# Patient Record
Sex: MALE
[De-identification: names, ages, dates, MRNs, and addresses within clinical notes are randomized per-mention and may not be internally consistent; named-entity substitution may affect disease eponyms.]

---

## 2018-10-24 ENCOUNTER — HOSPITAL ENCOUNTER (INPATIENT)
Dept: HOSPITAL 31 - C.ER | Age: 38
LOS: 4 days | Discharge: HOME | DRG: 772 | End: 2018-10-28
Attending: PSYCHIATRY & NEUROLOGY | Admitting: PSYCHIATRY & NEUROLOGY
Payer: MEDICAID

## 2018-10-24 DIAGNOSIS — F16.10: ICD-10-CM

## 2018-10-24 DIAGNOSIS — F12.90: ICD-10-CM

## 2018-10-24 DIAGNOSIS — F11.23: Primary | ICD-10-CM

## 2018-10-24 DIAGNOSIS — F32.2: ICD-10-CM

## 2018-10-24 DIAGNOSIS — F17.210: ICD-10-CM

## 2018-10-24 LAB
ALBUMIN SERPL-MCNC: 4.6 G/DL (ref 3.5–5)
ALBUMIN/GLOB SERPL: 0.9 {RATIO} (ref 1–2.1)
ALT SERPL-CCNC: 28 U/L (ref 21–72)
AST SERPL-CCNC: 20 U/L (ref 17–59)
BACTERIA #/AREA URNS HPF: (no result) /[HPF]
BASOPHILS # BLD AUTO: 0.1 K/UL (ref 0–0.2)
BASOPHILS NFR BLD: 0.8 % (ref 0–2)
BILIRUB UR-MCNC: NEGATIVE MG/DL
BUN SERPL-MCNC: 15 MG/DL (ref 9–20)
CALCIUM SERPL-MCNC: 10 MG/DL (ref 8.6–10.4)
EOSINOPHIL # BLD AUTO: 0.2 K/UL (ref 0–0.7)
EOSINOPHIL NFR BLD: 2.5 % (ref 0–4)
ERYTHROCYTE [DISTWIDTH] IN BLOOD BY AUTOMATED COUNT: 15.3 % (ref 11.5–14.5)
GFR NON-AFRICAN AMERICAN: > 60
GLUCOSE UR STRIP-MCNC: NORMAL MG/DL
HGB BLD-MCNC: 12 G/DL (ref 12–18)
LEUKOCYTE ESTERASE UR-ACNC: (no result) LEU/UL
LYMPHOCYTES # BLD AUTO: 2.3 K/UL (ref 1–4.3)
LYMPHOCYTES NFR BLD AUTO: 35.1 % (ref 20–40)
MCH RBC QN AUTO: 27.7 PG (ref 27–31)
MCHC RBC AUTO-ENTMCNC: 32.7 G/DL (ref 33–37)
MCV RBC AUTO: 84.6 FL (ref 80–94)
MONOCYTES # BLD: 0.4 K/UL (ref 0–0.8)
MONOCYTES NFR BLD: 5.4 % (ref 0–10)
NEUTROPHILS # BLD: 3.7 K/UL (ref 1.8–7)
NEUTROPHILS NFR BLD AUTO: 56.2 % (ref 50–75)
NRBC BLD AUTO-RTO: 0.1 % (ref 0–2)
PH UR STRIP: 6 [PH] (ref 5–8)
PLATELET # BLD: 302 K/UL (ref 130–400)
PMV BLD AUTO: 7.9 FL (ref 7.2–11.7)
PROT UR STRIP-MCNC: NEGATIVE MG/DL
RBC # BLD AUTO: 4.32 MIL/UL (ref 4.4–5.9)
RBC # UR STRIP: NEGATIVE /UL
SP GR UR STRIP: 1.02 (ref 1–1.03)
SQUAMOUS EPITHIAL: 2 /HPF (ref 0–5)
UROBILINOGEN UR-MCNC: NORMAL MG/DL (ref 0.2–1)
WBC # BLD AUTO: 6.5 K/UL (ref 4.8–10.8)

## 2018-10-24 PROCEDURE — HZ56ZZZ INDIVIDUAL PSYCHOTHERAPY FOR SUBSTANCE ABUSE TREATMENT, PSYCHOEDUCATION: ICD-10-PCS | Performed by: PSYCHIATRY & NEUROLOGY

## 2018-10-24 PROCEDURE — HZ2ZZZZ DETOXIFICATION SERVICES FOR SUBSTANCE ABUSE TREATMENT: ICD-10-PCS | Performed by: PSYCHIATRY & NEUROLOGY

## 2018-10-24 PROCEDURE — HZ59ZZZ INDIVIDUAL PSYCHOTHERAPY FOR SUBSTANCE ABUSE TREATMENT, SUPPORTIVE: ICD-10-PCS | Performed by: PSYCHIATRY & NEUROLOGY

## 2018-10-24 NOTE — C.PDOC
History Of Present Illness


37 y/o male presents to the ED requesting detox from heroin. Admits he last used

earlier today. Otherwise patient denies any physical complaints. Also denies any

suicidal or homicidal ideation. 





Time Seen by Provider: 10/24/18 20:23


Chief Complaint (Nursing): Substance Abuse


History Per: Patient


History/Exam Limitations: no limitations


Onset/Duration Of Symptoms: Days


Current Symptoms Are (Timing): Still Present


Suicide/Self Injury Attempted (Context): None


Modifying Factor(s): Other (Heroin)


Associated Symptoms: denies: Suicidal Thoughts, Suicidal Plan


Involuntary Hold By: None


Recent travel outside of the [place]: No





Past Medical History


Reviewed: Historical Data, Nursing Documentation, Vital Signs


Vital Signs: 





                                Last Vital Signs











Temp  97.6 F   10/24/18 20:06


 


Pulse  84   10/24/18 20:06


 


Resp  20   10/24/18 20:06


 


BP  110/74   10/24/18 20:06


 


Pulse Ox  100   10/24/18 20:06














- Medical History


PMH: HIV


Other PMH: Substance abuse


Family History: States: No Known Family Hx





- Social History


Hx Tobacco Use: Yes


Hx Alcohol Use: No


Hx Substance Use: Yes





- Immunization History


Hx Tetanus Toxoid Vaccination: No


Hx Influenza Vaccination: No


Hx Pneumococcal Vaccination: No





Review Of Systems


Constitutional: Negative for: Fever, Chills


Gastrointestinal: Negative for: Nausea, Vomiting


Psych: Positive for: Other (Heroin abuse).  Negative for: Suicidal ideation, 

Withdrawal





Physical Exam





- Physical Exam


Appears: Non-toxic, No Acute Distress


Skin: Warm, Dry


Head: Normacephalic


Eye(s): bilateral: Normal Inspection


Neck: Trachea Midline, Supple


Chest: Symmetrical


Cardiovascular: Rhythm Regular


Respiratory: No Rales, No Rhonchi, No Wheezing


Gastrointestinal/Abdominal: Soft, No Tenderness, No Distention


Extremity: Bilateral: Atraumatic, Normal Color And Temperature, Normal ROM


Pulses: Left Dorsalis Pedis: Normal, Right Dorsalis Pedis: Normal


Neurological/Psych: Oriented x3





ED Course And Treatment





- Laboratory Results


Result Diagrams: 


                                 10/24/18 20:29





                                 10/24/18 20:29


O2 Sat by Pulse Oximetry: 100 (RA)


Pulse Ox Interpretation: Normal


Progress Note: Blood work and urine sent to the lab for analysis. Crisis to 

arrange detox bed placement.





Disposition


Discussed With : Emma Francis


Comment: accepted the pt onhis service and took over the care at 10:02 PM


Doctor Will See Patient In The: Hospital


Counseled Patient/Family Regarding: Studies Performed, Diagnosis





- Disposition


Disposition: HOSPITALIZED


Disposition Time: 20:27


Condition: FAIR





- POA


Present On Arrival: None





- Clinical Impression


Clinical Impression: 


 Drug abuse








- Scribe Statement


The provider has reviewed the documentation as recorded by the Scribe (Shala Baires)


Provider Attestation: 





All medical record entries made by the Scribe were at my direction and 

personally dictated by me. I have reviewed the chart and agree that the record 

accurately reflects my personal performance of the history, physical exam, 

medical decision making, and the department course for this patient. I have also

personally directed, reviewed, and agree with the discharge instructions and 

disposition.





Decision To Admit





- Pt Status Changed To:


Hospital Disposition Of: Inpatient





- Admit Certification


Admit to Inpatient:: After my assessment, the patient will require 

hospitalization for at least two midnights.  This is because of the severity of 

symptoms shown, intensity of services needed, and/or the medical risk in this 

patient being treated as an outpatient.





- InPatient:


Physician Admission Certification: I certify that this patient requires 2 or 

more midnights of care for the following reason:: After my assessment, the 

patient will require hospitalization for at least two midnights.  This is 

because of the severity of symptoms shown, intensity of services needed, and/or 

the medical risk in this patient being treated as an outpatient.





- .


Bed Request Type: Detox


Admitting Physician: Emma Francis


Patient Diagnosis: 


 Drug abuse

## 2018-10-25 LAB
HEPATITIS A IGM: NEGATIVE
HEPATITIS B CORE AB: NEGATIVE
HEPATITIS C ANTIBODY: NEGATIVE

## 2018-10-25 NOTE — PCM.PSYCH
Initial Psychiatric Evaluation





- Initial Psychiatric Evaluation


Type of Admission: Voluntary


Legal Status: Capacity


Chief Complaint (in patient's own words): 





"Heroin, too much"


History of Present Illness and Precipitating Events: 





Pt is a 38 year old male who is single with two children ages 11 and 13. Pt is 

homeless and hopeful of returning to his family in [place] after this 

program. Pt is ill-appearing but approachable. 





He has been shooting 10 bags of heroin a day for the past two years. He first 

began using opiates 4-5 years ago. Pt has not been hospitalized for a similar 

condition before. He also denies undergoing detoxification or rehabilitation 

before. Pt denies using cocaine or Xanax but does reveal using marijuana. Pt 

denies using PCP though high levels of PCP are detected. He claims it must be 

laced in his MJ. Pt also smokes one pack of cigarettes a day but not does drink 

alcohol. Pt believes he has anxiety and depression but is not formally 

diagnosed. 


Pt is fatigued, anxious and depression.


Pt denies auditory, visual or tactile hallucinations, or delusions.





Past Medical History: HIV (+) non-compliant with meds and his CD4 count is not 

known.


Surgical History: Arm fracture ORIF


Psychiatric History: Denies but is now depressed


Family History: Denies


Legal History: Denies 


Traumatic History: Denies 


Current Medications: 





Active Medications











Generic Name Dose Route Start Last Admin





  Trade Name Freq  PRN Reason Stop Dose Admin


 


Acetaminophen  650 mg  10/24/18 23:02  





  Tylenol 325mg Tab  PO   





  Q4H PRN   





  Fever greater than 101 F   





     





     





     


 


Al Hydrox/Mg Hydrox/Simethicone  30 ml  10/24/18 23:06  





  Maalox 30 Ml  PO   





  TID PRN   





  Indigestion / Heartburn   





     





     





     


 


Clonidine HCl  0.1 mg  10/24/18 22:51  





  Catapres  PO   





  Q8 PRN   





  COWS Score More or Equal to 5   





     





     





     


 


Hydroxyzine HCl  25 mg  10/24/18 22:52  10/24/18 23:36





  Atarax  PO   25 mg





  Q6 PRN   Administration





  Agitation   





     





     





     


 


Loperamide HCl  2 mg  10/24/18 22:51  





  Imodium  PO   





  Q8 PRN   





  Diarrhea   





     





     





     


 


Lorazepam  1 mg  10/24/18 22:52  10/25/18 05:34





  Ativan  PO   1 mg





  Q6 PRN   Administration





  Anxiety   





     





     





     


 


Methadone HCl  20 mg  10/25/18 10:00  10/25/18 10:06





  Methadone  PO  10/30/18 09:59  20 mg





  Q24H HAMILTON   Administration





     





  Taper   





     





     


 


Nicotine  1 patch  10/25/18 10:15  10/25/18 11:03





  Nicoderm Cq  TD   1 patch





  DAILY HAMILTON   Administration





     





     





     





     


 


Ondansetron HCl  4 mg  10/24/18 22:51  





  Zofran Tab  PO   





  Q8 PRN   





  Nausea/Vomiting   





     





     





     


 


Pseudoephedrine HCl  60 mg  10/24/18 22:51  





  Sudafed Tab  PO   





  QID PRN   





  Nasal/Sinus Congestion   





     





     





     


 


Trazodone HCl  100 mg  10/25/18 22:00  





  Desyrel  PO   





  HS [facility]   





     





     





     





     














Past Psychiatric History





- Past Psychiatric History


Previous Treatment History: None


Pertinent Medical Hx (Current Medical&Sleep Prob, Allergies): 





                                    Allergies











Allergy/AdvReac Type Severity Reaction Status Date / Time


 


No Known Allergies Allergy   Unverified 10/24/18 20:12








                                        





RX: No Known Home Med  10/24/18 











Review of Systems





- Neurological


Neurological: Tremor





- Psychiatric


Psychiatric: Abnormal Sleep Pattern, Anhedonia, Anxiety, Behavioral Changes, 

Change in Appetite, Depression, Difficulty Concentrating, Irritability, Mood 

Swings, Panic Attacks.  absent: Hallucinations, Homicidal Ideation, Paranoia, 

Suicidal Ideation





Mental Status Examination





- Personal Presentation


Personal Presentation: Looks stated age (disheveled)





- Affect


Affect: Constricted





- Motor Activity


Motor Activity: Calm





- Reliability in Providing Information


Reliability in Providing Information: Fair





- Speech


Speech: Organized





- Mood


Mood: Depressed, Anxious





- Formal Thought Process


Formal Thought Process: No Impairment





- Cognitive Functions


Orientation: Person, Place, Situation, Time


Sensorium: Drowsy


Attention/Concentration: Easily distracted


Estimate of Intelligence: Average


Judgement: Intact, as evidence by: Insight regarding need for hospitalization


Memory: Recent intact, as evidence by: Ability to recall events of the day, 

Remote intact, as evidenced by: Ability to recall historical events





- Risk


Risk: Diminished functioning





- Strength & Assets Inventory


Strength & Assets Inventory: Cooperative





- Limitations


Limitations: Living alone





DSM 5 DX





- DSM 5


DSM 5 Diagnosis: 





Opioid withdrawal


Opioid use d/o - severe


Cannabis use d/o - severe


Major depression, single, severe


r/p PCP use d/o 


HIV





- Recommended/Plan of Treatment


Treatment Recommendations and Plan of Treatment: 


Methadone detox


As needed medications


Remeron for depression


Gabapentin for augmentation if needed


All risks, benefits and alternatives of medications, including no medications, 

discussed and the patient understood and agreed.


Attend groups and activities


Supportive therapy and psychoeducation


MI for abstinence


CBT for relapse prevention


Encourage MAT


Refer to rehab or IOP


Attend self-help groups as well


MI for smoking cessation and patch if needed


   


34 min





Projected ELOS: 4-5 days


Prognosis: good w treatment

## 2018-10-25 NOTE — PCM.BM
<Su Jacques - Last Filed: 10/25/18 01:57>





Treatment Plan Problems





- Problems identified on initial assessmt


  ** Opiate Dependence


Date Initiated: 10/24/18


Time Initiated: 23:30


Assessment reference: NA


Status: Active





Treatment assets and liabiliti


Patient Assests: ADL independent


Patient Liabilities: substance abuse





- Milieu Protocol


Maintain good personal hygiene: daily Encourage regular showers, daily Remind 

patient to perform daily oral care, daily Assist patient to perform ADL's


Maintain personal safety: every shift Educate patient to report safety concerns 

to staff, every shift Monitor environment for contraband/sharps


Medication safety: Monitor for expected outcome, potential side effects: every 

shift, Assess barriers to learning: every shift, Assess readiness for medication

education: every shift





<Amanda Reyes - Last Filed: 10/25/18 11:33>





- Diagnosis


(1) Opioid use disorder, severe, dependence


Status: Acute   


Interventions: 





10/25/18 11:33


* Assess 7x/week regarding severity of withdrawal


* Educate regarding risks, benefits, side effects and alternatives of 

  medications


* Use Motivational Interviewing for abstinence


* Use CBT for relapse prevention


* Medication management for withdrawal symptoms


* Encourage medication assisted treatment


*

## 2018-10-26 NOTE — PCM.PYCHPN
Psychiatric Progress Note





- Psychiatric Progress Note


Patient seen today, length of contact: 16 min


Patient Chief Complaint: 





"I'm not well"


Problems Identified/Issues Discussed: 





The pt is seen, chart reviewed, case discussed with staff.


The pt is compliant with medications and reports no side-effects.


Symptoms are improving but needs more time to stabilize. 


Extra doses given


Pt attends groups and activities.


Support given, psycho-education provided. 


After care discussed.


Medication Change: Yes (detox changes daily)


Medical Record Reviewed: Yes





Mental Status Examination





- Cognitive Function


Orientation: Person, Place, Situation, Time


Memory: Intact


Attention: Poor


Concentration: Poor


Association: WNL


Fund of Knowledge: Poor





- Mood


Mood: Depressed, Anxious





- Affect


Affect: Constricted





- Speech


Speech: Appropriate





- Formal Thought Process


Formal Thought Process: No Impairment





- Suicidal Ideation


Suicidal Ideation: No





- Homicidal Ideation


Homicidal Ideation: No





Goal/Treatment Plan





- Goal/Treatment Plan


Need for Continued Stay: Severe depression anxiety, Discharge may exacerbated 

symptoms, Severe functional impairment


Progress Toward Problem(s) and Goals/Treatment Plan: 


Methadone detox


As needed medications


Remeron for depression


Gabapentin for augmentation if needed


All risks, benefits and alternatives of medications, including no medications, 

discussed and the patient understood and agreed.


Attend groups and activities


Supportive therapy and psychoeducation


MI for abstinence


CBT for relapse prevention


Encourage MAT


Refer to rehab or IOP


Attend self-help groups as well


MI for smoking cessation and patch if needed


   


Estimated Date of D/C: 10/29/18

## 2018-10-27 NOTE — PCM.PYCHPN
Psychiatric Progress Note





- Psychiatric Progress Note


Patient seen today, length of contact: 15 min


Patient Chief Complaint: 





"I'm better"


Problems Identified/Issues Discussed: 


The pt is seen, chart reviewed, case discussed with staff.


Support given, CBT and MI used briefly


No new symptoms reported, improving slowly and needs more time


No SEs from medications, risks discussed.


After care discussed





Medication Change: Yes (detox changes daily)


Medical Record Reviewed: Yes





Mental Status Examination





- Cognitive Function


Orientation: Person, Place, Situation, Time


Memory: Intact


Attention: Poor


Concentration: Poor


Association: WNL


Fund of Knowledge: Poor





- Mood


Mood: Depressed, Anxious





- Affect


Affect: Constricted





- Speech


Speech: Appropriate





- Formal Thought Process


Formal Thought Process: No Impairment





- Suicidal Ideation


Suicidal Ideation: No





- Homicidal Ideation


Homicidal Ideation: No





Goal/Treatment Plan





- Goal/Treatment Plan


Need for Continued Stay: Severe depression anxiety, Discharge may exacerbated 

symptoms, Severe functional impairment


Progress Toward Problem(s) and Goals/Treatment Plan: 


Methadone detox


As needed medications


Remeron for depression


Gabapentin for augmentation if needed


All risks, benefits and alternatives of medications, including no medications, 

discussed and the patient understood and agreed.


Attend groups and activities


Supportive therapy and psychoeducation


MI for abstinence


CBT for relapse prevention


Encourage MAT


Refer to rehab or IOP


Attend self-help groups as well


MI for smoking cessation and patch if needed


   


Estimated Date of D/C: 10/29/18

## 2018-10-28 VITALS — RESPIRATION RATE: 20 BRPM

## 2018-10-28 VITALS
SYSTOLIC BLOOD PRESSURE: 115 MMHG | DIASTOLIC BLOOD PRESSURE: 77 MMHG | OXYGEN SATURATION: 99 % | TEMPERATURE: 98.6 F | HEART RATE: 87 BPM

## 2018-10-28 NOTE — PCM.PYCHDC
Mental Status Examination





- Mental Status Examination


Orientation: Person, Place, Situation, Time


Memory: Intact


Mood: Anxious


Affect: Constricted


Speech: Appropriate


Attention: WNL


Concentration: WNL


Association: WNL


Fund of Knowledge: WNL


Formal Thought Process: No Impairment


Suicidal Ideation: No


Current Homicidal Ideation?: No





Discharge Summary





- Discharge Note


Reason for Hospitalization: 





Opioid detox


Consultations:: List each consultation separately and include:  1. Reason for 

request.  2. Findings.  3. Follow-up


Summary of Hospital Course include:: 1. Description of specific treatment plan 

utilized for patients during their course of treatmen.  2. Summarize the time-

course for resolution of acute symptoms and/or regressed behaviors.  3. Describe

issues identified and worked on during hospitalization.  4. Describe medication 

utilized.  5. Describe medical problems identified and treated.  6. Reassessment

of suicide risk


Summary of Hospital Course: 


On Admission:





Pt is a 38 year old male who is single with two children ages 11 and 13. Pt is 

homeless and hopeful of returning to his family in [place] after this 

program. Pt is ill-appearing but approachable. 





He has been shooting 10 bags of heroin a day for the past two years. He first 

began using opiates 4-5 years ago. Pt has not been hospitalized for a similar 

condition before. He also denies undergoing detoxification or rehabilitation 

before. Pt denies using cocaine or Xanax but does reveal using marijuana. Pt 

denies using PCP though high levels of PCP are detected. He claims it must be 

laced in his MJ. Pt also smokes one pack of cigarettes a day but not does drink 

alcohol. Pt believes he has anxiety and depression but is not formally 

diagnosed. 


Pt is fatigued, anxious and depression.


Pt denies auditory, visual or tactile hallucinations, or delusions.





Past Medical History: HIV (+) non-compliant with meds and his CD4 count is not 

known.


Surgical History: Arm fracture ORIF


Psychiatric History: Denies but is now depressed


Family History: Denies


Legal History: Denies 


Traumatic History: Denies 





Hospital course:


The pt was admitted and started on treatment with psychotherapy, support, 

psychoeducation and medications. 


MI and CBT used.


The pt attended groups and activities, as well as milieu therapy.


All the risks and benefits of medications are discussed and the patient 

understood and agreed.


The pt improved with the treatments provided. 


After care discussed with the patient. He will go to [facility] tomorrow. He

left a day early to see his kids. He did complete the taper, though. 


Risks of not going to rehab directly discussed. He understood and he assured marlee

t he would stay clean.





- Final Diagnosis (DSM 5)


Condition upon Discharge: FAIR


DSM 5: 


Opioid withdrawal


Opioid use d/o - severe


Cannabis use d/o - severe


Major depression, single, severe


r/p PCP use d/o 


HIV








Disposition: HOME/ ROUTINE


Follow-up Treatment Plan: 


Continue below medications after discharge.


Follow after care plan as discussed.


Use relapse prevention skills   


Return to ER or call 911 if suicidal, homicidal or symptoms relapse.


Stay away from stress, alcohol and drugs.


See primary doctor regularly and get labs.    


Prescriptions/Medication Reconciliation: 


Mirtazapine [Remeron] 30 mg PO HS #30 tab


traZODone [Desyrel] 100 mg PO HS #30 tab

## 2019-03-22 ENCOUNTER — HOSPITAL ENCOUNTER (EMERGENCY)
Dept: HOSPITAL 31 - C.ER | Age: 39
Discharge: HOME | End: 2019-03-22
Payer: COMMERCIAL

## 2019-03-22 VITALS — BODY MASS INDEX: 22.3 KG/M2

## 2019-03-22 DIAGNOSIS — F11.10: Primary | ICD-10-CM

## 2019-03-22 NOTE — C.PDOC
History Of Present Illness


37 y/o male presents to the ED requesting detox from heroin. Last used this 

morning. Patient admits to using 25 bags of heroin per day, max use was 50 bags 

per day at one point. He denies any SI or HI. Patients only physical complaint 

is recent unintentional weight loss. States he has been losing weight rapidly, 

though he eats his usual diet throughout the day. Of note, patient is HIV+, 

diagnosed 5 years ago, without treatment. Failed multiple outpatient HIV 

regimens as he states it precipitates opioid withdrawal. Otherwise patient 

denies having any pain, tremors, fevers, chills, difficulty breathing, 

palpitations, or other physical complaints.





Time Seen by Provider: 03/22/19 17:23


Chief Complaint (Nursing): Substance Abuse


History Per: Patient


History/Exam Limitations: no limitations


Onset/Duration Of Symptoms: Days


Current Symptoms Are (Timing): Still Present


Modifying Factor(s): Other (Heroin)


Associated Symptoms: denies: Suicidal Thoughts, Suicidal Plan


Involuntary Hold By: None





Past Medical History


Reviewed: Historical Data, Nursing Documentation, Vital Signs





- Medical History


PMH: Depression, HIV


   Denies: Diabetes, Hepatitis, HTN, Seizures, Sexually Transmitted Disease





- [facility] Procedures











DETOXIFICATION SERVICES FOR SUBSTANCE ABUSE TREATMENT (10/24/18)


[facility] PSYCHOTHERAPY FOR SUBSTANCE ABUSE TREATMENT, SUPPORT (10/24/18)


[facility] PSYCHOTHERAPY FOR SUBSTANCE ABUSE, PSYCHOEDUCATION (10/24/18)








Family History: States: Unknown Family Hx





- Social History


Hx Tobacco Use: Yes


Hx Alcohol Use: No (Former)


Hx Substance Use: Yes (IV Heroin)





- Immunization History


Hx Tetanus Toxoid Vaccination: No


Hx Influenza Vaccination: No


Hx Pneumococcal Vaccination: No





Review Of Systems


Except As Marked, All Systems Reviewed And Found Negative.


Constitutional: Positive for: Weight loss.  Negative for: Fever, Chills


Cardiovascular: Negative for: Chest Pain


Respiratory: Negative for: Cough, Shortness of Breath


Gastrointestinal: Negative for: Nausea, Vomiting


Musculoskeletal: Negative for: Back Pain


Neurological: Negative for: Weakness, Numbness, Dizziness





Physical Exam





- Physical Exam


Appears: Non-toxic, No Acute Distress


Skin: Warm, Dry, No Rash


Head: Atraumatic, Normacephalic, Other (Bitemporal wasting)


Eye(s): bilateral: Normal Inspection, PERRL, EOMI


Neck: Normal ROM


Chest: Symmetrical


Cardiovascular: Rhythm Regular, No Murmur


Respiratory: Normal Breath Sounds, No Accessory Muscle Use


Gastrointestinal/Abdominal: Soft, No Tenderness, No Distention


Extremity: Bilateral: Atraumatic, Normal ROM (no tremors), Other (Track marks to

the B/L antecubital fossa, No s/s of infection)


Pulses: Left Radial: Normal, Right Radial: Normal


Neurological/Psych: Oriented x3, Normal Speech





Medical Decision Making


Medical Decision Making: 





heroine abuse, ongoing


no detox avail


outpatient f/u for prescreening educated





narcan for PRN





pt counseled regarding the importance of follow up and treatment for HIV


provided with resources for the clinic and CRC


pt verbalizes understanding of and agreement to plan 





Disposition


Doctor Will See Patient In The: Office


Counseled Patient/Family Regarding: Studies Performed, Diagnosis, Need For 

Followup, Rx Given





- Disposition


Referrals: 


[facility] [Outside]


[facility] [place] [Outside]


[facility] [Outside]


[facility] [Outside]


Disposition: HOME/ ROUTINE


Disposition Time: 17:45


Condition: GOOD


Additional Instructions: 


seek outpatient pre-screening for heroine detox





Carry Narcan in case of accidental overdose.


Prescriptions: 


Naloxone HCl [Narcan] 4 mg NS ONCE PRN #1 spray


 PRN Reason: overdose


Instructions:  Opioid Use Disorder


Forms:  [facility] (English)





- Clinical Impression


Clinical Impression: 


 Heroin abuse








- Scribe Statement


The provider has reviewed the documentation as recorded by the Curtis Baires





Provider Attestation: 


All medical record entries made by the Latonyaibbuck were at my direction and 

personally dictated by me. I have reviewed the chart and agree that the record 

accurately reflects my personal performance of the history, physical exam, 

medical decision making, and the department course for this patient. I have also

 personally directed, reviewed, and agree with the discharge instructions and 

disposition.